# Patient Record
(demographics unavailable — no encounter records)

---

## 2024-12-13 NOTE — HISTORY OF PRESENT ILLNESS
[de-identified] : Patient is a 65-year-old female here for evaluation bilateral hips bilateral knees.  Patient has very long history of bilateral hip and knee pain.  Patient has history of mixed connective tissue disease, fibromyalgia.  Patient is currently under care of her rheumatologist, was told to follow-up with orthopedics for consult on possible surgical intervention including hip and knee replacement.  Patient using rollator for ambulation, pain worsens with activity.  Large body habitus  Bilateral knee exam: No effusion no ecchymosis no erythema tenderness palpation the medial lateral joint line, range of motion 0 degrees to 110 degrees with discomfort, patellar crepitus with discomfort.  Mild decreased quad strength, no gross instability neurovascularly intact negative Homans  Bilateral hip exam no effusion no ecchymosis no erythema no tenderness palpation mild decreased range of motion with pain to the lateral hip, no gross instability neurovascular intact  X-ray bilateral hip 3 views for comparison: No acute fractures, subluxations, dislocations.  Mild osteoarthritis bilateral hips  X-ray bilateral knees 3 views with comparison: No acute fractures, subluxations, dislocations.  Severe osteoarthritis tricompartmental bilateral knees  Discussed with patient in detail that she has severe osteoarthritis of bilateral knees, mild osteoarthritis bilateral hips.  Discussed treatment options detail including conservative versus surgical.  At this point patient has failed conservative treatments, discussed total knee replacement in detail with patient.  Plan is for weight loss and to follow-up in a couple months for consult on surgery with Dr. Maria Victoria Fitzpatrick.  Patient understands agrees with plan.

## 2025-02-20 NOTE — HISTORY OF PRESENT ILLNESS
[de-identified] : 65-year-old lady is here today for an evaluation of her knees. Severe bilateral knee arthritis which is some what more symptomatic on the left side. Main complaints involve pain crepitation.  She wants to consider replacement but she would like to try to lose weight first.  Her weight is around 250 pounds.  She is around 5 foot 2.  She has a medical history which is significant for mixed connective tissue disease, she had a prior spinal abscesses and prior psoas abscesses, she is not sure why these develop.  She takes hydroxychloroquine.  She denies any drug use.  Exam shows pain crepitation of both knees, varus alignment, she is using a walker.  X-rays demonstrate severe arthritis of both knees.  Recommend weight loss, try to get down to around 215, at which point we can start to schedule, however would like her to be down around 200 pounds for the surgery.

## 2025-06-26 NOTE — REVIEW OF SYSTEMS
[SOB] : shortness of breath [Negative] : Heme/Lymph [Palpitations] : palpitations [Dyspnea on exertion] : not dyspnea during exertion [Chest Discomfort] : no chest discomfort [Lower Ext Edema] : no extremity edema [Leg Claudication] : no intermittent leg claudication [Orthopnea] : no orthopnea [PND] : no PND [Syncope] : no syncope

## 2025-06-26 NOTE — ASSESSMENT
[FreeTextEntry1] : Ms. DANIELLE CELAYA is a 65 year old woman with PMHx of HTN, HLD who is presenting for follow up.   -Order TTE to rule out structural changes -Order CCTA to rule out ischemia and evaluate for CAD. Order metoprolol tartrate 50 (2 tabs) to take the night before and morning of the CCTA. She has history of selfish allergy (hives). She also had an IV pyelogram 40+ years ago, and had hives and was told she can never have iodinated contrast. She has not had it since. She has never had a respiratory allergic reaction. Will prescribe contrast allergy meds prior to CCTA. Defer exercise stress testing since she walks with a walker.  -Recommended MCOT, but she wishes to defer since she will be swimming in her pool regularly.  -Encouraged improved lifestyle modifications with these recommendations below: -Plant-based and Mediterranean diets, along with increased fruit, nut, vegetable, legume, and lean vegetable or animal protein (preferably fish) consumption -Engage in at least 150 minutes per week of accumulated moderate-intensity aerobic physical activity or 75 minutes per week of vigorous-intensity aerobic physical activity  Time in encounter, including face to face visit and time reviewing chart: 35  minutes  Roverto Farooq MD, FACC Non-Invasive Cardiology [address], [address] Office: [phone]

## 2025-06-26 NOTE — REASON FOR VISIT
[Symptom and Test Evaluation] : symptom and test evaluation [FreeTextEntry1] : Ms. DANIELLE CELAYA is a 65 year old woman with PMHx of HTN, HLD who is presenting for follow up. She previously followed with Dr Diaz.  She has been having palpitations, and sensation of a fast heart rate. It comes and goes, but feels like it has been worse than usual. She also has dyspnea, often associated with the fast HR. She measures her HR and it ranges from 60-120s. She has no chest pain.   TTE 9/13/22 Summary: 1.Normal left ventricular internal cavity size. 2.The left ventricular ejection fraction calculated by biplane is 59 %. 3.There is no evidence of pericardial effusion. 4.Trace mitral valve regurgitation. 5.Mild tricuspid regurgitation. 6.Trace pulmonic valve regurgitation

## 2025-06-26 NOTE — ASSESSMENT
[FreeTextEntry1] : Ms. DANIELLE CELAYA is a 65 year old woman with PMHx of HTN, HLD who is presenting for follow up.   -Order TTE to rule out structural changes -Order CCTA to rule out ischemia and evaluate for CAD. Order metoprolol tartrate 50 (2 tabs) to take the night before and morning of the CCTA. She has history of selfish allergy (hives). She also had an IV pyelogram 40+ years ago, and had hives and was told she can never have iodinated contrast. She has not had it since. She has never had a respiratory allergic reaction. Will prescribe contrast allergy meds prior to CCTA. Defer exercise stress testing since she walks with a walker.  -Recommended MCOT, but she wishes to defer since she will be swimming in her pool regularly.  -Encouraged improved lifestyle modifications with these recommendations below: -Plant-based and Mediterranean diets, along with increased fruit, nut, vegetable, legume, and lean vegetable or animal protein (preferably fish) consumption -Engage in at least 150 minutes per week of accumulated moderate-intensity aerobic physical activity or 75 minutes per week of vigorous-intensity aerobic physical activity  Time in encounter, including face to face visit and time reviewing chart: 35  minutes  Roverto Farooq MD, FACC Non-Invasive Cardiology [address], [address] Office: [phone]

## 2025-06-26 NOTE — PHYSICAL EXAM
[Well Developed] : well developed [Well Nourished] : well nourished [No Acute Distress] : no acute distress [Normal Conjunctiva] : normal conjunctiva [Normal Venous Pressure] : normal venous pressure [No Carotid Bruit] : no carotid bruit [Normal S1, S2] : normal S1, S2 [No Murmur] : no murmur [No Rub] : no rub [No Gallop] : no gallop [Clear Lung Fields] : clear lung fields [Good Air Entry] : good air entry [No Respiratory Distress] : no respiratory distress  [Soft] : abdomen soft [No Edema] : no edema [No Rash] : no rash [No Skin Lesions] : no skin lesions [Moves all extremities] : moves all extremities [No Focal Deficits] : no focal deficits [Normal Speech] : normal speech [Alert and Oriented] : alert and oriented [Normal memory] : normal memory [Abnormal Gait] : abnormal gait